# Patient Record
Sex: FEMALE | Race: WHITE | NOT HISPANIC OR LATINO | Employment: OTHER | URBAN - METROPOLITAN AREA
[De-identification: names, ages, dates, MRNs, and addresses within clinical notes are randomized per-mention and may not be internally consistent; named-entity substitution may affect disease eponyms.]

---

## 2023-09-27 ENCOUNTER — OFFICE VISIT (OUTPATIENT)
Dept: URGENT CARE | Facility: CLINIC | Age: 73
End: 2023-09-27
Payer: MEDICARE

## 2023-09-27 VITALS
OXYGEN SATURATION: 95 % | HEIGHT: 62 IN | WEIGHT: 170 LBS | DIASTOLIC BLOOD PRESSURE: 68 MMHG | TEMPERATURE: 97.9 F | HEART RATE: 90 BPM | BODY MASS INDEX: 31.28 KG/M2 | RESPIRATION RATE: 20 BRPM | SYSTOLIC BLOOD PRESSURE: 128 MMHG

## 2023-09-27 DIAGNOSIS — J06.9 ACUTE URI: Primary | ICD-10-CM

## 2023-09-27 PROBLEM — F32.A DEPRESSION: Status: ACTIVE | Noted: 2023-09-27

## 2023-09-27 PROBLEM — E78.1 HYPERTRIGLYCERIDEMIA: Status: ACTIVE | Noted: 2021-08-03

## 2023-09-27 PROBLEM — R91.8 PULMONARY NODULES: Status: ACTIVE | Noted: 2023-09-27

## 2023-09-27 PROBLEM — M81.0 OSTEOPOROSIS: Status: ACTIVE | Noted: 2023-09-27

## 2023-09-27 PROBLEM — R80.9 MICROALBUMINURIA: Status: ACTIVE | Noted: 2023-09-27

## 2023-09-27 PROBLEM — I10 HYPERTENSION: Status: ACTIVE | Noted: 2021-08-03

## 2023-09-27 PROBLEM — K76.0 FATTY LIVER: Status: ACTIVE | Noted: 2023-09-27

## 2023-09-27 PROBLEM — E11.9 TYPE 2 DIABETES MELLITUS (HCC): Status: ACTIVE | Noted: 2021-08-03

## 2023-09-27 PROBLEM — I35.8 AORTIC VALVE SCLEROSIS: Status: ACTIVE | Noted: 2023-09-27

## 2023-09-27 PROBLEM — I07.1 MILD TRICUSPID REGURGITATION: Status: ACTIVE | Noted: 2023-09-27

## 2023-09-27 PROBLEM — F41.9 ANXIETY: Status: ACTIVE | Noted: 2023-09-27

## 2023-09-27 PROBLEM — N20.0 NEPHROLITHIASIS: Status: ACTIVE | Noted: 2021-08-03

## 2023-09-27 PROBLEM — R93.89 ABNORMAL FINDING ON RADIOLOGY EXAM: Status: ACTIVE | Noted: 2021-08-03

## 2023-09-27 PROBLEM — E04.2 MULTIPLE THYROID NODULES: Status: ACTIVE | Noted: 2021-08-03

## 2023-09-27 PROBLEM — J47.9 BRONCHIECTASIS (HCC): Status: ACTIVE | Noted: 2023-09-27

## 2023-09-27 PROBLEM — I25.10 CORONARY ARTERY DISEASE: Status: ACTIVE | Noted: 2020-09-25

## 2023-09-27 LAB
SARS-COV-2 AG UPPER RESP QL IA: NEGATIVE
VALID CONTROL: NORMAL

## 2023-09-27 PROCEDURE — 87811 SARS-COV-2 COVID19 W/OPTIC: CPT | Performed by: FAMILY MEDICINE

## 2023-09-27 PROCEDURE — 99203 OFFICE O/P NEW LOW 30 MIN: CPT | Performed by: FAMILY MEDICINE

## 2023-09-27 RX ORDER — OMEPRAZOLE 20 MG/1
20 CAPSULE, DELAYED RELEASE ORAL EVERY MORNING
COMMUNITY
Start: 2023-07-26

## 2023-09-27 RX ORDER — DULOXETIN HYDROCHLORIDE 60 MG/1
60 CAPSULE, DELAYED RELEASE ORAL DAILY
COMMUNITY
Start: 2023-07-13

## 2023-09-27 RX ORDER — AMLODIPINE BESYLATE 2.5 MG/1
2.5 TABLET ORAL DAILY
COMMUNITY
Start: 2023-09-11

## 2023-09-27 RX ORDER — FLUTICASONE PROPIONATE 50 MCG
1 SPRAY, SUSPENSION (ML) NASAL DAILY
Qty: 9.9 ML | Refills: 0 | Status: SHIPPED | OUTPATIENT
Start: 2023-09-27

## 2023-09-27 RX ORDER — BLOOD-GLUCOSE METER
EACH MISCELLANEOUS
COMMUNITY
Start: 2023-07-28

## 2023-09-27 RX ORDER — DULAGLUTIDE 1.5 MG/.5ML
INJECTION, SOLUTION SUBCUTANEOUS
COMMUNITY
Start: 2023-08-16

## 2023-09-27 RX ORDER — BENZONATATE 200 MG/1
200 CAPSULE ORAL 3 TIMES DAILY PRN
Qty: 20 CAPSULE | Refills: 0 | Status: SHIPPED | OUTPATIENT
Start: 2023-09-27

## 2023-09-27 RX ORDER — IRBESARTAN 300 MG/1
300 TABLET ORAL DAILY
COMMUNITY
Start: 2023-07-06

## 2023-09-27 RX ORDER — LANCETS 30 GAUGE
EACH MISCELLANEOUS
COMMUNITY
Start: 2023-08-18

## 2023-09-27 RX ORDER — PSYLLIUM HUSK (WITH SUGAR) 3.4 G/7 G
POWDER (GRAM) ORAL
COMMUNITY
Start: 2023-08-03

## 2023-09-27 RX ORDER — HYDROCHLOROTHIAZIDE 25 MG/1
25 TABLET ORAL DAILY
COMMUNITY
Start: 2023-07-13

## 2023-09-27 RX ORDER — BLOOD SUGAR DIAGNOSTIC
STRIP MISCELLANEOUS
COMMUNITY
Start: 2023-09-17

## 2023-09-27 RX ORDER — ALENDRONATE SODIUM 70 MG/1
1 TABLET ORAL WEEKLY
COMMUNITY

## 2023-09-27 RX ORDER — ROSUVASTATIN CALCIUM 20 MG/1
20 TABLET, COATED ORAL
COMMUNITY
Start: 2023-08-22

## 2023-09-27 RX ORDER — ASPIRIN 81 MG/1
81 TABLET ORAL DAILY
COMMUNITY

## 2023-09-27 RX ORDER — PEN NEEDLE, DIABETIC 32GX 5/32"
NEEDLE, DISPOSABLE MISCELLANEOUS
COMMUNITY
Start: 2023-07-24

## 2023-09-27 RX ORDER — METFORMIN HYDROCHLORIDE 500 MG/1
TABLET, EXTENDED RELEASE ORAL
COMMUNITY
Start: 2023-09-25

## 2023-09-27 RX ORDER — INSULIN DEGLUDEC INJECTION 100 U/ML
INJECTION, SOLUTION SUBCUTANEOUS
COMMUNITY
Start: 2023-08-08

## 2023-09-27 NOTE — PROGRESS NOTES
[facility]        NAME: Jose Enrique New is a 72 y.o. female  : 1950    MRN: [id number]  DATE: 2023  TIME: 2:07 PM    Assessment and Plan   Acute URI [J06.9]  1. Acute URI  Poct Covid 19 Rapid Antigen Test    benzonatate (TESSALON) 200 MG capsule    fluticasone (FLONASE) 50 mcg/act nasal spray            Patient Instructions     Patient Instructions   Acute viral upper respiratory infection   - COVID-19 test performed in office is negative   - patient is to rest and drink plenty of fluids  - take Tylenol or Motrin as needed   - run a humidifier at home   - Flonase nasal spray prescribed, use as directed   - may take plain Mucinex as needed for chest congestion  - Tessalon pearls prescribed to be taken as needed for cough   - if symptoms persist despite treatment, worsen, or any new symptoms present, should be seen by PCP for re-check. Follow up with PCP in 3-5 days. Proceed to  ER if symptoms worsen. Chief Complaint     Chief Complaint   Patient presents with   • Cold Like Symptoms     Pt ill x 4 days, cough, sinus pressure, no fever. No meds used. No Covid test done, pt is vaxed. History of Present Illness       73 yo female presents c/o sinus pressure, nasal congestion, post-nasal drip, chest congestion, and productive cough. She has been ill x 4 days. No fever/chills. No headache or body aches. No chest pain, SOB, or wheezing. Patient is not a smoker. No loss of taste or smell. No recent travel or known exposure to anyone with COVID-19. Patient has received the COVID-19 vaccine. She states her granddaughter was recently diagnosed w/ RSV. Patient has not attempted any treatment for the symptoms. Rapid COVID-19 test performed in office is negative. Review of Systems   Review of Systems   Constitutional: Negative. HENT:        As noted in HPI   Eyes: Negative. Respiratory:        As noted in HPI   Gastrointestinal: Negative. Musculoskeletal: Negative. Skin: Negative. Allergic/Immunologic:        As noted in chart   Neurological: Negative. Hematological: Negative.           Current Medications       Current Outpatient Medications:   •  alendronate (FOSAMAX) 70 mg tablet, Take 1 tablet by mouth once a week, Disp: , Rfl:   •  amLODIPine (NORVASC) 2.5 mg tablet, Take 2.5 mg by mouth daily, Disp: , Rfl:   •  aspirin (ECOTRIN LOW STRENGTH) 81 mg EC tablet, Take 81 mg by mouth daily, Disp: , Rfl:   •  BD Pen Needle Luz 2nd Gen 32G X 4 MM MISC, ONE DAILY, Disp: , Rfl:   •  benzonatate (TESSALON) 200 MG capsule, Take 1 capsule (200 mg total) by mouth 3 (three) times a day as needed for cough, Disp: 20 capsule, Rfl: 0  •  Blood Glucose Monitoring Suppl (OneTouch Verio Flex System) w/Device KIT, USE TO CHECK GLUCOSE DAILY DX E11.65 90 DAYS, Disp: , Rfl:   •  Cholecalciferol 25 MCG (1000 UT) tablet, Take 2,000 Units by mouth daily, Disp: , Rfl:   •  [employer] Skin Treatment 12 % lotion, APPLY TO THICKENED PATCHES OF THE FEET TWICE DAILY X 3 WEEKS., Disp: , Rfl:   •  DULoxetine (CYMBALTA) 60 mg delayed release capsule, Take 60 mg by mouth daily, Disp: , Rfl:   •  fluticasone (FLONASE) 50 mcg/act nasal spray, 1 spray into each nostril daily, Disp: 9.9 mL, Rfl: 0  •  glucose blood test strip, Test daily    Test machine-[facility]   [id number], Disp: , Rfl:   •  hydrochlorothiazide (HYDRODIURIL) 25 mg tablet, Take 25 mg by mouth daily, Disp: , Rfl:   •  irbesartan (AVAPRO) 300 mg tablet, Take 300 mg by mouth daily, Disp: , Rfl:   •  metFORMIN (GLUCOPHAGE-XR) 500 mg 24 hr tablet, , Disp: , Rfl:   •  omeprazole (PriLOSEC) 20 mg delayed release capsule, Take 20 mg by mouth every morning, Disp: , Rfl:   •  OneTouch Verio test strip, ONE IN VITRO DAILY 90 DAYS, Disp: , Rfl:   •  rosuvastatin (CRESTOR) 20 MG tablet, Take 20 mg by mouth daily at bedtime, Disp: , Rfl:   •  Tresiba FlexTouch 100 units/mL injection pen, 46 UNITS SUBCUTANEOUS ONCE DAILY 90 DAYS, Disp: , Rfl:   •  Trulicity 1.5 MG/0.5ML injection, 1.5MG SUBCUTANEOUS ONCE WEEKLY 90 DAYS, Disp: , Rfl:   •  Lancets (OneTouch Delica Plus [id number]) [facility], USE 2 DAILY 90 DAYS, Disp: , Rfl:     Current Allergies     Allergies as of 2023 - Reviewed 2023   Allergen Reaction Noted   • Penicillins Hives and Rash 2014   • Sulfa antibiotics Hives and Rash 2014            The following portions of the patient's history were reviewed and updated as appropriate: allergies, current medications, past family history, past medical history, past social history, past surgical history and problem list.     Past Medical History:   Diagnosis Date   • Diabetes mellitus ([address])    • Hyperlipidemia    • Hypertension    • Thyroid nodule        Past Surgical History:   Procedure Laterality Date   • ANAL FISSURECTOMY     •  SECTION      x3       Family History   Problem Relation Age of Onset   • Heart disease Mother    • Diabetes Mother          Medications have been verified. Objective   /68   Pulse 90   Temp 97.9 °F (36.6 °C)   Resp 20   Ht 5' 2" (1.575 m)   Wt 77.1 kg (170 lb)   SpO2 95%   BMI 31.09 kg/m²   No LMP recorded. Physical Exam     Physical Exam  Vitals and nursing note reviewed. Constitutional:       General: She is awake. She is not in acute distress. Appearance: Normal appearance. She is well-developed and well-groomed. She is not ill-appearing, toxic-appearing or diaphoretic. HENT:      Head: Normocephalic and atraumatic. Jaw: There is normal jaw occlusion. Right Ear: Tympanic membrane, ear canal and external ear normal.      Left Ear: Tympanic membrane, ear canal and external ear normal.      Nose: Nose normal.      Right Sinus: No maxillary sinus tenderness or frontal sinus tenderness. Left Sinus: No maxillary sinus tenderness or frontal sinus tenderness. Mouth/Throat:      Lips: Pink. No lesions.       Mouth: Mucous membranes are moist.      Pharynx: Oropharynx is clear. Uvula midline. Eyes:      General: Lids are normal.      Conjunctiva/sclera: Conjunctivae normal.   Neck:      Trachea: Trachea and phonation normal.   Cardiovascular:      Rate and Rhythm: Normal rate and regular rhythm. Pulses: Normal pulses. Heart sounds: Normal heart sounds. Pulmonary:      Effort: Pulmonary effort is normal. No tachypnea, accessory muscle usage or respiratory distress. Breath sounds: Normal breath sounds and air entry. Musculoskeletal:      Cervical back: Neck supple. No edema, erythema, rigidity or tenderness. Lymphadenopathy:      Cervical: No cervical adenopathy. Skin:     General: Skin is warm and dry. Capillary Refill: Capillary refill takes less than 2 seconds. Coloration: Skin is not pale. Neurological:      Mental Status: She is alert and oriented to person, place, and time. Mental status is at baseline. Psychiatric:         Mood and Affect: Mood normal.         Behavior: Behavior normal. Behavior is cooperative. Thought Content:  Thought content normal.         Judgment: Judgment normal.

## 2023-09-27 NOTE — PATIENT INSTRUCTIONS
Acute viral upper respiratory infection   - COVID-19 test performed in office is negative   - patient is to rest and drink plenty of fluids  - take Tylenol or Motrin as needed   - run a humidifier at home   - Flonase nasal spray prescribed, use as directed   - may take plain Mucinex as needed for chest congestion  - Tessalon pearls prescribed to be taken as needed for cough   - if symptoms persist despite treatment, worsen, or any new symptoms present, should be seen by PCP for re-check.